# Patient Record
Sex: FEMALE | Race: BLACK OR AFRICAN AMERICAN | NOT HISPANIC OR LATINO | URBAN - NONMETROPOLITAN AREA
[De-identification: names, ages, dates, MRNs, and addresses within clinical notes are randomized per-mention and may not be internally consistent; named-entity substitution may affect disease eponyms.]

---

## 2019-03-26 ENCOUNTER — IMPORTED ENCOUNTER (OUTPATIENT)
Dept: URBAN - NONMETROPOLITAN AREA CLINIC 1 | Facility: CLINIC | Age: 64
End: 2019-03-26

## 2019-03-26 PROBLEM — H25.813: Noted: 2018-03-20

## 2019-03-26 PROBLEM — R51: Noted: 2019-03-26

## 2019-03-26 PROBLEM — H52.4: Noted: 2018-03-20

## 2019-03-26 PROCEDURE — S0621 ROUTINE OPHTHALMOLOGICAL EXA: HCPCS

## 2019-03-26 NOTE — PATIENT DISCUSSION
Presbyopia- Discussed diagnosis in detail with patient - New glasses Rx given today- Continue to monitor- RTC 1 year complete Cataracts OU- Discussed diagnosis in detail with patient- Discussed signs and symptoms of progression- Discussed UV protection- No treatment needed at this time - Continue to monitorHeadaches-  Discussed findings-  No abnormal findings seen on exam today to explain them-  Discussed further with PCP if changes are noted; 's Notes: MR  3/26/19DFE  3/26/19

## 2020-05-29 ENCOUNTER — IMPORTED ENCOUNTER (OUTPATIENT)
Dept: URBAN - NONMETROPOLITAN AREA CLINIC 1 | Facility: CLINIC | Age: 65
End: 2020-05-29

## 2020-05-29 PROCEDURE — 92015 DETERMINE REFRACTIVE STATE: CPT

## 2020-05-29 PROCEDURE — 92014 COMPRE OPH EXAM EST PT 1/>: CPT

## 2020-05-29 NOTE — PATIENT DISCUSSION
PresbyopiaDiscussed refractive status in detail with patient. New glasses Rx given today. Continue to monitor. 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 Discussed signs and symptoms of progression. Discussed UV protection. No treatment needed at this time. Continue to monitor. HeadachesDiscussed findings. No abnormal findings seen on exam today to explain them. Discussed further with PCP if changes are noted.

## 2021-06-04 ENCOUNTER — IMPORTED ENCOUNTER (OUTPATIENT)
Dept: URBAN - NONMETROPOLITAN AREA CLINIC 1 | Facility: CLINIC | Age: 66
End: 2021-06-04

## 2021-06-04 PROBLEM — H52.4: Noted: 2018-03-20

## 2021-06-04 PROBLEM — H25.813: Noted: 2018-03-20

## 2021-06-04 PROBLEM — H04.123: Noted: 2021-06-04

## 2021-06-04 PROBLEM — R51: Noted: 2021-06-04

## 2021-06-04 PROBLEM — R51.9: Noted: 2021-06-04

## 2021-06-04 PROCEDURE — 92015 DETERMINE REFRACTIVE STATE: CPT

## 2021-06-04 PROCEDURE — 92014 COMPRE OPH EXAM EST PT 1/>: CPT

## 2021-06-04 NOTE — PATIENT DISCUSSION
PresbyopiaDiscussed refractive status in detail with patient. New glasses Rx given today but ok to continue using OTC readers. Continue to monitor. 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 Discussed signs and symptoms of progression. Discussed UV protection. No treatment needed at this time. Continue to monitor. 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 Start Refresh Relieva BID OU/PRN samples given today. Continue to monitor. HeadachesDiscussed findings. No abnormal findings seen on exam today to explain them. Discussed further with PCP if changes are noted.

## 2022-04-15 ASSESSMENT — VISUAL ACUITY
OS_CC: 20/20-
OS_CC: 20/20-
OD_CC: 20/20
OS_CC: 20/20-
OD_CC: 20/25+
OD_CC: 20/20-

## 2022-04-15 ASSESSMENT — TONOMETRY
OS_IOP_MMHG: 19
OS_IOP_MMHG: 17
OS_IOP_MMHG: 16
OD_IOP_MMHG: 18
OD_IOP_MMHG: 19
OD_IOP_MMHG: 16

## 2022-06-06 ENCOUNTER — COMPREHENSIVE EXAM (OUTPATIENT)
Dept: URBAN - NONMETROPOLITAN AREA CLINIC 1 | Facility: CLINIC | Age: 67
End: 2022-06-06

## 2022-06-06 DIAGNOSIS — H52.4: ICD-10-CM

## 2022-06-06 PROCEDURE — 92014 COMPRE OPH EXAM EST PT 1/>: CPT

## 2022-06-06 PROCEDURE — 92015 DETERMINE REFRACTIVE STATE: CPT

## 2022-06-06 ASSESSMENT — TONOMETRY
OS_IOP_MMHG: 16
OD_IOP_MMHG: 16

## 2022-06-06 ASSESSMENT — VISUAL ACUITY
OS_SC: 20/25-1
OD_SC: 20/40+

## 2024-01-11 ENCOUNTER — ESTABLISHED PATIENT (OUTPATIENT)
Dept: URBAN - NONMETROPOLITAN AREA CLINIC 1 | Facility: CLINIC | Age: 69
End: 2024-01-11

## 2024-01-11 DIAGNOSIS — H52.4: ICD-10-CM

## 2024-01-11 PROCEDURE — 92015 DETERMINE REFRACTIVE STATE: CPT

## 2024-01-11 PROCEDURE — 92014 COMPRE OPH EXAM EST PT 1/>: CPT

## 2024-01-11 ASSESSMENT — VISUAL ACUITY
OS_SC: 20/20-1
OD_SC: 20/22-1
OU_SC: 20/20

## 2024-01-11 ASSESSMENT — TONOMETRY
OD_IOP_MMHG: 19
OS_IOP_MMHG: 19

## 2025-01-13 ENCOUNTER — COMPREHENSIVE EXAM (OUTPATIENT)
Age: 70
End: 2025-01-13

## 2025-01-13 DIAGNOSIS — H52.4: ICD-10-CM

## 2025-01-13 PROCEDURE — 92015 DETERMINE REFRACTIVE STATE: CPT

## 2025-01-13 PROCEDURE — 92014 COMPRE OPH EXAM EST PT 1/>: CPT
